# Patient Record
Sex: FEMALE | Race: WHITE | Employment: OTHER | URBAN - METROPOLITAN AREA
[De-identification: names, ages, dates, MRNs, and addresses within clinical notes are randomized per-mention and may not be internally consistent; named-entity substitution may affect disease eponyms.]

---

## 2016-08-18 LAB
CREATININE, EXTERNAL: 0.86
HBA1C MFR BLD HPLC: 6.3 %

## 2017-01-04 ENCOUNTER — OFFICE VISIT (OUTPATIENT)
Dept: ENDOCRINOLOGY | Age: 82
End: 2017-01-04

## 2017-01-04 ENCOUNTER — HOSPITAL ENCOUNTER (OUTPATIENT)
Dept: LAB | Age: 82
Discharge: HOME | End: 2017-01-04

## 2017-01-04 VITALS
HEIGHT: 64 IN | RESPIRATION RATE: 18 BRPM | TEMPERATURE: 98.2 F | DIASTOLIC BLOOD PRESSURE: 68 MMHG | OXYGEN SATURATION: 97 % | HEART RATE: 61 BPM | WEIGHT: 208 LBS | BODY MASS INDEX: 35.51 KG/M2 | SYSTOLIC BLOOD PRESSURE: 152 MMHG

## 2017-01-04 DIAGNOSIS — E04.2 MULTIPLE THYROID NODULES: ICD-10-CM

## 2017-01-04 DIAGNOSIS — E04.2 GOITER, NONTOXIC, MULTINODULAR: Primary | ICD-10-CM

## 2017-01-10 ENCOUNTER — TELEPHONE (OUTPATIENT)
Dept: ENDOCRINOLOGY | Age: 82
End: 2017-01-10

## 2017-01-10 NOTE — TELEPHONE ENCOUNTER
----- Message from Kiko Arias sent at 1/10/2017  9:23 AM EST -----  Regarding: Dr. Jorge Márquez Telephone  Patient would like her results from her biopsy.  Contact is [phone]

## 2017-01-16 NOTE — PROGRESS NOTES
I tried calling her twice reg her results - right atypical and left clear cancer   Could not connect     Jyothi Joseph MD

## 2017-01-17 NOTE — TELEPHONE ENCOUNTER
----- Message from Butter sent at 1/16/2017 12:40 PM EST -----  Regarding: Dr Thomas/telephone  Pt is calling to get Test Result, please call pt at [phone],[facility] is the  pt 2nd message.

## 2017-01-17 NOTE — TELEPHONE ENCOUNTER
----- Message from [employer] sent at 1/16/2017  5:32 PM EST -----  Regarding: Dr. Neri Mejia   Pt returned a missed call.  Pt best contact number is [phone]

## 2017-03-08 ENCOUNTER — HOSPITAL ENCOUNTER (OUTPATIENT)
Dept: LAB | Age: 82
Discharge: HOME | End: 2017-03-08

## 2017-03-08 ENCOUNTER — OFFICE VISIT (OUTPATIENT)
Dept: ENDOCRINOLOGY | Age: 82
End: 2017-03-08

## 2017-03-08 VITALS
HEART RATE: 79 BPM | SYSTOLIC BLOOD PRESSURE: 135 MMHG | HEIGHT: 64 IN | RESPIRATION RATE: 14 BRPM | TEMPERATURE: 98.6 F | DIASTOLIC BLOOD PRESSURE: 69 MMHG | WEIGHT: 198 LBS | BODY MASS INDEX: 33.8 KG/M2

## 2017-03-08 DIAGNOSIS — E04.1 THYROID NODULE: Primary | ICD-10-CM

## 2017-03-08 NOTE — PROGRESS NOTES
Wt Readings from Last 3 Encounters:   03/08/17 198 lb (89.8 kg)   01/04/17 208 lb (94.3 kg)   10/20/16 201 lb (91.2 kg)     Temp Readings from Last 3 Encounters:   03/08/17 98.6 °F (37 °C) (Oral)   01/04/17 98.2 °F (36.8 °C) (Oral)   10/20/16 99 °F (37.2 °C)     BP Readings from Last 3 Encounters:   03/08/17 135/69   01/04/17 152/68   10/20/16 158/66     Pulse Readings from Last 3 Encounters:   03/08/17 79   01/04/17 61   10/20/16 74

## 2017-03-08 NOTE — MR AVS SNAPSHOT
Visit Information Date & Time Provider Department Dept. Phone Encounter #  
 3/8/2017  3:00 PM Betzaida Perez MD [facility] [phone] [id number] Upcoming Health Maintenance Date Due DTaP/Tdap/Td series (1 - Tdap) 5/3/1956 ZOSTER VACCINE AGE 60> 5/3/1997 GLAUCOMA SCREENING Q2Y 5/3/2002 OSTEOPOROSIS SCREENING (DEXA) 5/3/2002 Pneumococcal 65+ High/Highest Risk (1 of 2 - PCV13) 5/3/2002 MEDICARE YEARLY EXAM 5/3/2002 INFLUENZA AGE 9 TO ADULT 8/1/2016 Allergies as of 3/8/2017  Review Complete On: 3/8/2017 By: Apolinar Fisher LPN Severity Noted Reaction Type Reactions Statins-hmg-coa Reductase Inhibitors High 09/25/2013    Other (comments) LOSS OF MUSCLE TONE Current Immunizations  Never Reviewed No immunizations on file. Not reviewed this visit Vitals BP Pulse Temp Resp Height(growth percentile) Weight(growth percentile) 135/69 (BP 1 Location: Left arm, BP Patient Position: Sitting) 79 98.6 °F (37 °C) (Oral) 14 5' 4\" (1.626 m) 198 lb (89.8 kg) BMI OB Status Smoking Status 33.99 kg/m2 Hysterectomy Never Smoker BMI and BSA Data Body Mass Index Body Surface Area  
 33.99 kg/m 2 2.01 m 2 Preferred Pharmacy Pharmacy Name Phone [id number] [facility] [phone] Your Updated Medication List  
  
   
This list is accurate as of: 3/8/17  4:33 PM.  Always use your most recent med list.  
  
  
  
  
 aspirin-dipyridamole  mg per SR capsule Commonly known as:  AGGRENOX Take 1 Cap by mouth two (2) times a day. bacitracin ointment Commonly known as:  BACITRACIN Apply  to affected area two (2) times a day. citalopram 20 mg tablet Commonly known as:  Lajuanda Gearing Take  by mouth daily. Citracal + D tablet Generic drug:  calcium citrate-vitamin D3 Take  by mouth two (2) times a day. CO Q-10 10 mg Cap Generic drug:  coenzyme q10 Take  by mouth. dilTIAZem  mg XR capsule Commonly known as:  DILACOR XR Take  by mouth daily. FLONASE 50 mcg/actuation nasal spray Generic drug:  fluticasone 2 Sprays by Both Nostrils route daily. KRILL OIL PO Take  by mouth.  
  
 magnesium 250 mg Tab Take  by mouth.  
  
 vitamin a-vitamin c-vit e-min tablet Commonly known as:  Delta Hinders Take 1 Tab by mouth daily. VITAMIN D3 2,000 unit Tab Generic drug:  cholecalciferol (vitamin D3) Take  by mouth. Introducing [facility]! [facility] introduces [employer] patient portal. Now you can access parts of your medical record, email your doctor's office, and request medication refills online. 1. In your internet browser, go to [email or web address] 2. Click on the First Time User? Click Here link in the Sign In box. You will see the New Member Sign Up page. 3. Enter your [employer] Access Code exactly as it appears below. You will not need to use this code after youve completed the sign-up process. If you do not sign up before the expiration date, you must request a new code. · [employer] Access Code: [email or web address] Expires: 6/6/2017  4:33 PM 
 
4. Enter the last four digits of your Social Security Number (xxxx) and Date of Birth (mm/dd/yyyy) as indicated and click Submit. You will be taken to the next sign-up page. 5. Create a [employer] ID. This will be your [employer] login ID and cannot be changed, so think of one that is secure and easy to remember. 6. Create a [employer] password. You can change your password at any time. 7. Enter your Password Reset Question and Answer. This can be used at a later time if you forget your password. 8. Enter your e-mail address. You will receive e-mail notification when new information is available in [address] 9. Click Sign Up. You can now view and download portions of your medical record. 10. Click the Download Summary menu link to download a portable copy of your medical information. If you have questions, please visit the Frequently Asked Questions section of the [employer] website. Remember, [employer] Access is NOT to be used for urgent needs. For medical emergencies, dial 911. Now available from your iPhone and Android! Please provide this summary of care documentation to your next provider. Your primary care clinician is listed as Diego Fitzgerald. If you have any questions after today's visit, please call [phone].

## 2017-03-08 NOTE — PROGRESS NOTES
[facility] ENDOCRINOLOGY  OFFICE PROCEDURE PROGRESS NOTE        Chart reviewed for the following:   Grady Rojas MD, have reviewed the History, Physical and updated the Allergic reactions for Marianna Porang[address]54 performed immediately prior to start of procedure:   Grady Rojas MD, have performed the following reviews on [address] prior to the start of the procedure:            * Patient was identified by name and date of birth   * Agreement on procedure being performed was verified  * Risks and Benefits explained to the patient  * Procedure site verified and marked as necessary  * Patient was positioned for comfort  * Consent was signed and verified     Time: 3.30 pm      Date of procedure: 3/8/2017    Procedure performed by:  Matt Mancera MD    Provider assisted by: Gelacio Lakhani time imaging was performed in both transverse and sagittal planes    Right lateral most cystic Nodule ( repeat as the previous one was commented as atypical epithelium /cell on one slide )  Pt has 3 benign looking cystic nodules on right side     Following informed consent, a procedural pause was held to confirm patiient identity and site of biopsy. After sterile preparation, FNA guidance was performed using direct ultrasound guidance to confirm accurate needle placement. 4 aspirations were made using 25 G needles  Samples were submitted to cytology  Patient  tolerated procedure well without complications  After care instructions provided.

## 2017-03-16 NOTE — PROGRESS NOTES
I gave her the results   considering her age, pt and myself combindly decided to leave it alone with no surgery   F/u in 6 months

## 2022-02-08 ENCOUNTER — HOSPITAL ENCOUNTER (OUTPATIENT)
Dept: RADIATION THERAPY | Age: 87
Discharge: HOME | End: 2022-02-08

## 2022-02-09 ENCOUNTER — HOSPITAL ENCOUNTER (OUTPATIENT)
Dept: RADIATION THERAPY | Age: 87
Discharge: HOME | End: 2022-02-09
Payer: MEDICARE

## 2022-02-09 PROCEDURE — 77290 THER RAD SIMULAJ FIELD CPLX: CPT

## 2022-02-10 ENCOUNTER — HOSPITAL ENCOUNTER (OUTPATIENT)
Dept: RADIATION THERAPY | Age: 87
Discharge: HOME | End: 2022-02-10
Payer: MEDICARE

## 2022-02-10 PROCEDURE — 77386 HC IMRT TRMT DLVR COMPL: CPT

## 2022-02-10 PROCEDURE — 77301 RADIOTHERAPY DOSE PLAN IMRT: CPT

## 2022-02-10 PROCEDURE — 77300 RADIATION THERAPY DOSE PLAN: CPT

## 2022-02-10 PROCEDURE — 77338 DESIGN MLC DEVICE FOR IMRT: CPT

## 2022-02-11 ENCOUNTER — HOSPITAL ENCOUNTER (OUTPATIENT)
Dept: RADIATION THERAPY | Age: 87
Discharge: HOME | End: 2022-02-11
Payer: MEDICARE

## 2022-02-11 PROCEDURE — 77386 HC IMRT TRMT DLVR COMPL: CPT

## 2022-02-11 PROCEDURE — 77387 GUIDANCE FOR RADJ TX DLVR: CPT

## 2022-02-14 ENCOUNTER — HOSPITAL ENCOUNTER (OUTPATIENT)
Dept: RADIATION THERAPY | Age: 87
Discharge: HOME | End: 2022-02-14
Payer: MEDICARE

## 2022-02-14 PROCEDURE — 77386 HC IMRT TRMT DLVR COMPL: CPT

## 2022-02-15 ENCOUNTER — HOSPITAL ENCOUNTER (OUTPATIENT)
Dept: RADIATION THERAPY | Age: 87
Discharge: HOME | End: 2022-02-15
Payer: MEDICARE

## 2022-02-15 PROCEDURE — 77014 HC CT SCAN FOR THERAPY GUIDE: CPT

## 2022-02-15 PROCEDURE — 77386 HC IMRT TRMT DLVR COMPL: CPT

## 2022-02-16 ENCOUNTER — HOSPITAL ENCOUNTER (OUTPATIENT)
Dept: RADIATION THERAPY | Age: 87
Discharge: HOME | End: 2022-02-16
Payer: MEDICARE

## 2022-02-16 PROCEDURE — 77336 RADIATION PHYSICS CONSULT: CPT

## 2022-02-16 PROCEDURE — 77385 HC IMRT TRMT DLVR SMPL: CPT

## 2022-03-23 ENCOUNTER — HOSPITAL ENCOUNTER (OUTPATIENT)
Dept: RADIATION THERAPY | Age: 87
Discharge: HOME | End: 2022-03-23

## 2022-04-26 ENCOUNTER — TELEPHONE (OUTPATIENT)
Dept: ENT CLINIC | Age: 87
End: 2022-04-26

## 2022-04-26 NOTE — TELEPHONE ENCOUNTER
LVM informing pt that appt scheduled 5/6 has been rescheduled to 2pm on 5/6 with Dr. Belkis Ventura. I asked the pt to call back if that appt time does not work in her schedule.

## 2022-05-06 ENCOUNTER — OFFICE VISIT (OUTPATIENT)
Dept: ENT CLINIC | Age: 87
End: 2022-05-06
Payer: MEDICARE

## 2022-05-06 VITALS
BODY MASS INDEX: 26.46 KG/M2 | OXYGEN SATURATION: 99 % | DIASTOLIC BLOOD PRESSURE: 72 MMHG | WEIGHT: 155 LBS | HEIGHT: 64 IN | HEART RATE: 73 BPM | RESPIRATION RATE: 17 BRPM | SYSTOLIC BLOOD PRESSURE: 120 MMHG

## 2022-05-06 DIAGNOSIS — J32.0 CHRONIC LEFT MAXILLARY SINUSITIS: Primary | ICD-10-CM

## 2022-05-06 PROCEDURE — 31231 NASAL ENDOSCOPY DX: CPT | Performed by: SPECIALIST

## 2022-05-06 PROCEDURE — 99203 OFFICE O/P NEW LOW 30 MIN: CPT | Performed by: SPECIALIST

## 2022-05-06 RX ORDER — DRONABINOL 2.5 MG/1
CAPSULE ORAL
COMMUNITY
Start: 2022-04-29

## 2022-05-06 RX ORDER — COLESEVELAM 180 1/1
TABLET ORAL
COMMUNITY

## 2022-05-06 RX ORDER — DRONABINOL 2.5 MG/1
CAPSULE ORAL
COMMUNITY
Start: 2021-11-16

## 2022-05-06 RX ORDER — FAMOTIDINE 10 MG/1
TABLET ORAL
COMMUNITY

## 2022-05-06 RX ORDER — POLYSACCHARIDE-IRON COMPLEX 150 MG/1
150 CAPSULE ORAL 2 TIMES DAILY
COMMUNITY
Start: 2022-04-29

## 2022-05-06 RX ORDER — CYCLOBENZAPRINE HCL 5 MG
5 TABLET ORAL 3 TIMES DAILY
COMMUNITY
Start: 2021-11-08

## 2022-05-06 RX ORDER — HYDROCHLOROTHIAZIDE 12.5 MG/1
CAPSULE ORAL
COMMUNITY

## 2022-05-06 RX ORDER — ASCORBIC ACID 125 MG
TABLET,CHEWABLE ORAL
COMMUNITY

## 2022-05-06 RX ORDER — ACETAMINOPHEN, PSEUDOEPHEDRINE HYDROCHLORIDE, AND DEXTROMETHORPHAN HYDROBROMIDE 500; 30; 15 MG/1; MG/1; MG/1
TABLET, FILM COATED ORAL
COMMUNITY

## 2022-05-06 RX ORDER — VALACYCLOVIR HYDROCHLORIDE 1 G/1
TABLET, FILM COATED ORAL
COMMUNITY
Start: 2022-03-17

## 2022-05-06 RX ORDER — CETIRIZINE HCL 10 MG
TABLET ORAL
COMMUNITY

## 2022-05-06 NOTE — PROGRESS NOTES
Subjective: Shukri Brain   82 y.o.   1935     New Patient Visit  82-year-old female with unresectable metastatic colon cancer receiving palliative XRT who was found on PET scan 1/18/2022 to have a left maxillary mucocele. The patient can have some thick rhinitis. Review of Systems  ROS         Heent: No diplopia, no hearing loss, no tinnitis, no nasal congestion, no sinus pain, no dysphygia, no sore throat. Neck:  No neck mass, no neck pain  Respiratory:  No cough, no hemoptysis, no SOB, no wheezing  CV:  No chest pain, no arrythmias, no syncope  GI:  No nausea, no vomiting, no abdominal pain  Neuro:  No headache, no loss of consciousness, no paralysis, no weakness      Physical Exam    General: NAD, well-developed well-nourished  Eyes: PERRLA, EOMs intact  Ears: External canals clear, TMs:  Clear, Tuning fork exam normal  Septum midline, turbinates normal, mucosa normal, no external deformity  Mouth: Mucosa normal, tongue normal, floor of mouth normal  Throat: Clear, tonsils absent  Neck: Supple without masses, no bruits     Neuro: Cranial nerves II through XII grossly intact  Fiberoptic nasal endoscopy: After proper consent and under topical anesthesia the flexible scope was passed into the right side of the nose. The middle meatus and inferior meatus are clear and the nasopharynx is clear. The scope was then passed on the left side of the nose. The middle meatus and inferior meatus are clear and the nasopharynx is clear. Patient tolerated procedure well.        Past Medical History:   Diagnosis Date    Anxiety     Bleeds easily ([address]     CAD (coronary artery disease)     Cancer ([address]     SCCA RIGHT CHEEK    Colon cancer ([address]     Essential hypertension     Family history of skin cancer     brother has skin cancer on bridge of nose    Hypertension     Skin cancer     Sun-damaged skin     As a child was exposed to sun frequently     Type 2 diabetes mellitus ([address]      Past Surgical History:   Procedure Laterality Date    HX APPENDECTOMY  1970S    HX CHOLECYSTECTOMY  early 80's    HX HEART CATHETERIZATION  1999    HX HYSTERECTOMY  1980    HX OOPHORECTOMY      HX PARTIAL COLECTOMY        Family History   Problem Relation Age of Onset    Hypertension Mother     Cancer Father         LUNG CA    Asthma Neg Hx     Alcohol abuse Neg Hx     Heart Disease Neg Hx     Diabetes Neg Hx     Stroke Neg Hx     Malignant Hyperthermia Neg Hx     Pseudocholinesterase Deficiency Neg Hx     Delayed Awakening Neg Hx     Post-op Nausea/Vomiting Neg Hx      Social History     Tobacco Use    Smoking status: Never Smoker    Smokeless tobacco: Never Used   Substance Use Topics    Alcohol use: Never     Comment: Occasional glass of wine      Prior to Admission medications    Medication Sig Start Date End Date Taking? Authorizing Provider   valACYclovir (VALTREX) 1 gram tablet take 2 tablets by mouth every 12 hours if needed 3/17/22  Yes Provider, Historical   cyanocobalamin, vitamin B-12, 5,000 mcg cap cyanocobalamin (vitamin B-12) 5,000 mcg capsule   Take 1 capsule every day by oral route. Yes Provider, Historical   famotidine (Pepcid AC) 10 mg tablet Pepcid AC 10 mg tablet   Take 1 tablet every day by oral route. Yes Provider, Historical   cyclobenzaprine (FLEXERIL) 5 mg tablet Take 5 mg by mouth three (3) times daily. 11/8/21  Yes Provider, Historical   cetirizine (ZyrTEC) 10 mg tablet Zyrtec 10 mg tablet   Take 1 tablet every day by oral route. Yes Provider, Historical   iFerex 150 150 mg iron capsule Take 150 mg by mouth two (2) times a day. 4/29/22  Yes Provider, Historical   dronabinoL (Marinol) 2.5 mg capsule Marinol 2.5 mg capsule   Take 1 capsule every 12 hours by oral route as directed. 11/16/21  Yes Provider, Historical   fluticasone (FLONASE) 50 mcg/actuation nasal spray 2 Sprays by Both Nostrils route daily.    Yes Provider, Historical   calcium citrate-vitamin D3 (CITRACAL + D) tablet Take  by mouth two (2) times a day. Yes Provider, Historical   cholecalciferol, vitamin D3, (VITAMIN D3) 2,000 unit Tab Take  by mouth. Yes Provider, Historical   colesevelam (WelChoL) 625 mg tablet WelChol 625 mg tablet   Take 1 tablet(s) twice a day and if no SEs in 2 weeks increase to 2 BID by oral route for 30 days. Patient not taking: Reported on 5/6/2022    Provider, Historical   dronabinoL (MARINOL) 2.5 mg capsule take 1 capsule by mouth twice a day every 12 hours  Patient not taking: Reported on 5/6/2022 4/29/22   Provider, Historical   hydroCHLOROthiazide (MICROZIDE) 12.5 mg capsule hydrochlorothiazide 12.5 mg capsule   Take 1 capsule every day by oral route. Patient not taking: Reported on 5/6/2022    Provider, Historical   MULTIVITAMIN 12 IV multivitamin    Provider, Historical   pseudoephed-dm-acetaminophen (Tylenol Cold-Flu Multi-Act Day) 30- mg tab Tylenol 500 mg tablet   Take 2 tablets every 6 hours by oral route as needed. Provider, Historical   aspirin-dipyridamole (AGGRENOX)  mg per SR capsule Take 1 Cap by mouth two (2) times a day. Patient not taking: Reported on 5/6/2022    Provider, Historical   citalopram (CELEXA) 20 mg tablet Take  by mouth daily. Patient not taking: Reported on 5/6/2022    Provider, Historical   coenzyme q10 (CO Q-10) 10 mg cap Take  by mouth. Patient not taking: Reported on 5/6/2022    Provider, Historical   vitamin a-vitamin c-vit e-min (OCUVITE) tablet Take 1 Tab by mouth daily. Patient not taking: Reported on 5/6/2022    Provider, Historical   magnesium 250 mg tab Take  by mouth. Patient not taking: Reported on 5/6/2022    Provider, Historical   bacitracin (BACITRACIN) ointment Apply  to affected area two (2) times a day. Patient not taking: Reported on 5/6/2022 9/27/13   Haylee Richardson MD   diltiazem XR (DILACOR XR) 180 mg XR capsule Take  by mouth daily.     Patient not taking: Reported on 5/6/2022    Provider, Historical   CHAUNCEY OIL PO Take  by mouth. Patient not taking: Reported on 5/6/2022    Provider, Historical                    Objective:     Visit Vitals  /72 (BP 1 Location: Left upper arm, BP Patient Position: Sitting, BP Cuff Size: Adult)   Pulse 73   Resp 17   Ht 5' 4\" (1.626 m)   Wt 155 lb (70.3 kg)   SpO2 99%   BMI 26.61 kg/m²        Allergies   Allergen Reactions    Statins-Hmg-Coa Reductase Inhibitors Other (comments)     LOSS OF MUSCLE TONE         Assessment/Plan:   Incidental left maxillary mucocele found on PET/CT and patient with stage IV colon cancer: Saline nasal spray, follow-up as needed  Encounter Diagnoses   Name Primary?  Chronic left maxillary sinusitis Yes     Orders Placed This Encounter    NASAL ENDOSCOPY,DX    valACYclovir (VALTREX) 1 gram tablet    colesevelam (WelChoL) 625 mg tablet    cyanocobalamin, vitamin B-12, 5,000 mcg cap    dronabinoL (MARINOL) 2.5 mg capsule    famotidine (Pepcid AC) 10 mg tablet    hydroCHLOROthiazide (MICROZIDE) 12.5 mg capsule    cyclobenzaprine (FLEXERIL) 5 mg tablet    cetirizine (ZyrTEC) 10 mg tablet    iFerex 150 150 mg iron capsule    dronabinoL (Marinol) 2.5 mg capsule    MULTIVITAMIN 12 IV    pseudoephed-dm-acetaminophen (Tylenol Cold-Flu Multi-Act Day) 30- mg tab     Follow-up and Dispositions    · Return if symptoms worsen or fail to improve. Jeovanny Suero MD, [address] [facility]    [address]  [address] [phone]

## 2023-05-23 RX ORDER — COLESEVELAM 180 1/1
TABLET ORAL
COMMUNITY

## 2023-05-23 RX ORDER — CETIRIZINE HYDROCHLORIDE 10 MG/1
TABLET ORAL
COMMUNITY

## 2023-05-23 RX ORDER — FLUTICASONE PROPIONATE 50 MCG
2 SPRAY, SUSPENSION (ML) NASAL DAILY
COMMUNITY

## 2023-05-23 RX ORDER — CITALOPRAM 20 MG/1
TABLET ORAL DAILY
COMMUNITY

## 2023-05-23 RX ORDER — FAMOTIDINE 10 MG
TABLET ORAL
COMMUNITY

## 2023-05-23 RX ORDER — DRONABINOL 2.5 MG/1
CAPSULE ORAL
COMMUNITY
Start: 2021-11-16

## 2023-05-23 RX ORDER — DILTIAZEM HYDROCHLORIDE 180 MG/1
CAPSULE, EXTENDED RELEASE ORAL DAILY
COMMUNITY

## 2023-05-23 RX ORDER — HYDROCHLOROTHIAZIDE 12.5 MG/1
CAPSULE, GELATIN COATED ORAL
COMMUNITY

## 2023-05-23 RX ORDER — ASPIRIN AND DIPYRIDAMOLE 25; 200 MG/1; MG/1
1 CAPSULE, EXTENDED RELEASE ORAL 2 TIMES DAILY
COMMUNITY

## 2023-05-23 RX ORDER — THIAMINE HCL 100 MG
TABLET ORAL 2 TIMES DAILY
COMMUNITY

## 2023-05-23 RX ORDER — BACITRACIN ZINC 500 [USP'U]/G
OINTMENT TOPICAL 2 TIMES DAILY
COMMUNITY
Start: 2013-09-27

## 2023-05-23 RX ORDER — IRON POLYSACCHARIDE COMPLEX 150 MG
150 CAPSULE ORAL 2 TIMES DAILY
COMMUNITY
Start: 2022-04-29

## 2023-05-23 RX ORDER — CYCLOBENZAPRINE HCL 5 MG
5 TABLET ORAL 3 TIMES DAILY
COMMUNITY
Start: 2021-11-08

## 2023-05-23 RX ORDER — VALACYCLOVIR HYDROCHLORIDE 1 G/1
TABLET, FILM COATED ORAL
COMMUNITY
Start: 2022-03-17